# Patient Record
Sex: FEMALE | Race: WHITE
[De-identification: names, ages, dates, MRNs, and addresses within clinical notes are randomized per-mention and may not be internally consistent; named-entity substitution may affect disease eponyms.]

---
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
## 2021-01-21 ENCOUNTER — HOSPITAL ENCOUNTER (OUTPATIENT)
Dept: HOSPITAL 53 - M PLALAB | Age: 21
End: 2021-01-21
Attending: ADVANCED PRACTICE MIDWIFE
Payer: MEDICAID

## 2021-01-21 DIAGNOSIS — Z11.3: ICD-10-CM

## 2021-01-21 DIAGNOSIS — E28.2: Primary | ICD-10-CM

## 2021-01-21 LAB
CHLAMYDIA DNA AMPLIFICATION: NEGATIVE
EST. AVERAGE GLUCOSE BLD GHB EST-MCNC: 111 MG/DL (ref 60–110)
N GONORRHOEA RRNA SPEC QL NAA+PROBE: NEGATIVE

## 2021-01-23 ENCOUNTER — HOSPITAL ENCOUNTER (OUTPATIENT)
Dept: HOSPITAL 53 - M LABSMTC | Age: 21
End: 2021-01-23
Attending: PEDIATRICS
Payer: SELF-PAY

## 2021-01-23 DIAGNOSIS — Z20.822: Primary | ICD-10-CM

## 2021-04-30 ENCOUNTER — HOSPITAL ENCOUNTER (OUTPATIENT)
Dept: HOSPITAL 53 - M WHC | Age: 21
End: 2021-04-30
Attending: ADVANCED PRACTICE MIDWIFE
Payer: MEDICAID

## 2021-04-30 DIAGNOSIS — E28.2: Primary | ICD-10-CM

## 2021-04-30 DIAGNOSIS — Z30.431: ICD-10-CM

## 2021-04-30 NOTE — REP
INDICATION:

E28.2 PCOS,Z30.431 SURVEILLANCE OF IUD.



COMPARISON:

Comparison pelvic sonography 10 July 2020..



TECHNIQUE:

Transabdominal and transvaginal scanning were performed.



FINDINGS:

Uterine dimensions are normal at 8.0 x 3.0 x 4.1 cm.  Endometrial echo is 0.4 cm thick

and centrally placed.  No free fluid is seen in the cul-de-sac.  Visualized bladder

walls are smooth. An IUD is again noted in the uterine endometrium in good position.

Visualized bladder walls are smooth.



The right ovary has dimensions of 3.8 x 2.3 x 2.8 cm.  It's Doppler flow is normal

with a resistive index of 0.63.





The left ovary dimensions are normal as well at 4.0 x 2.1 x 3.1 cm.  It's Doppler flow

was normal with resistive index of 0.69.



IMPRESSION:

Normal pelvic sonography.  IUD remains in place, good position.





<Electronically signed by José Miguel Nunez > 04/30/21 2069

## 2021-06-03 ENCOUNTER — HOSPITAL ENCOUNTER (OUTPATIENT)
Dept: HOSPITAL 53 - M LAB REF | Age: 21
End: 2021-06-03
Attending: PHYSICIAN ASSISTANT
Payer: COMMERCIAL

## 2021-06-03 DIAGNOSIS — B34.9: Primary | ICD-10-CM

## 2021-08-19 ENCOUNTER — HOSPITAL ENCOUNTER (OUTPATIENT)
Dept: HOSPITAL 53 - M LABSMTC | Age: 21
End: 2021-08-19
Attending: PEDIATRICS

## 2021-08-19 DIAGNOSIS — Z20.822: Primary | ICD-10-CM

## 2022-01-12 ENCOUNTER — HOSPITAL ENCOUNTER (OUTPATIENT)
Dept: HOSPITAL 53 - M LABSMTC | Age: 22
End: 2022-01-12
Attending: PEDIATRICS

## 2022-01-12 DIAGNOSIS — Z20.822: Primary | ICD-10-CM

## 2022-04-25 ENCOUNTER — HOSPITAL ENCOUNTER (OUTPATIENT)
Dept: HOSPITAL 53 - M WUC | Age: 22
End: 2022-04-25
Attending: FAMILY MEDICINE
Payer: COMMERCIAL

## 2022-04-25 DIAGNOSIS — F41.9: Primary | ICD-10-CM

## 2022-04-25 LAB
25(OH)D3 SERPL-MCNC: 12.7 NG/ML (ref 30–100)
BASOPHILS # BLD AUTO: 0 10^3/UL (ref 0–0.2)
BASOPHILS NFR BLD AUTO: 0.2 % (ref 0–1)
BUN SERPL-MCNC: 14 MG/DL (ref 7–18)
CALCIUM SERPL-MCNC: 8.8 MG/DL (ref 8.5–10.1)
CHLORIDE SERPL-SCNC: 107 MEQ/L (ref 98–107)
CO2 SERPL-SCNC: 30 MEQ/L (ref 21–32)
CREAT SERPL-MCNC: 0.74 MG/DL (ref 0.55–1.3)
EOSINOPHIL # BLD AUTO: 0.1 10^3/UL (ref 0–0.5)
EOSINOPHIL NFR BLD AUTO: 0.9 % (ref 0–3)
GFR SERPL CREATININE-BSD FRML MDRD: > 60 ML/MIN/{1.73_M2} (ref 60–?)
GLUCOSE SERPL-MCNC: 91 MG/DL (ref 70–100)
HCT VFR BLD AUTO: 40.3 % (ref 36–47)
HGB BLD-MCNC: 13.3 G/DL (ref 12–15.5)
LYMPHOCYTES # BLD AUTO: 3.5 10^3/UL (ref 1.5–5)
LYMPHOCYTES NFR BLD AUTO: 39.3 % (ref 24–44)
MCH RBC QN AUTO: 30.7 PG (ref 27–33)
MCHC RBC AUTO-ENTMCNC: 33 G/DL (ref 32–36.5)
MCV RBC AUTO: 93.1 FL (ref 80–96)
MONOCYTES # BLD AUTO: 0.9 10^3/UL (ref 0–0.8)
MONOCYTES NFR BLD AUTO: 10.3 % (ref 2–8)
NEUTROPHILS # BLD AUTO: 4.3 10^3/UL (ref 1.5–8.5)
NEUTROPHILS NFR BLD AUTO: 48.8 % (ref 36–66)
PLATELET # BLD AUTO: 361 10^3/UL (ref 150–450)
POTASSIUM SERPL-SCNC: 4 MEQ/L (ref 3.5–5.1)
RBC # BLD AUTO: 4.33 10^6/UL (ref 4–5.4)
SODIUM SERPL-SCNC: 141 MEQ/L (ref 136–145)
TSH SERPL DL<=0.005 MIU/L-ACNC: 1.67 UIU/ML (ref 0.36–3.74)
WBC # BLD AUTO: 8.9 10^3/UL (ref 4–10)

## 2022-06-23 ENCOUNTER — HOSPITAL ENCOUNTER (OUTPATIENT)
Dept: HOSPITAL 53 - M PLALAB | Age: 22
End: 2022-06-23
Attending: ADVANCED PRACTICE MIDWIFE
Payer: COMMERCIAL

## 2022-06-23 DIAGNOSIS — Z12.4: Primary | ICD-10-CM

## 2022-06-23 LAB
EST. AVERAGE GLUCOSE BLD GHB EST-MCNC: 105 MG/DL (ref 60–110)
N GONORRHOEA RRNA SPEC QL NAA+PROBE: NEGATIVE

## 2022-06-23 PROCEDURE — 36415 COLL VENOUS BLD VENIPUNCTURE: CPT

## 2022-06-23 PROCEDURE — 87850 N. GONORRHOEAE ASSAY W/OPTIC: CPT

## 2022-06-23 PROCEDURE — 87810 CHLMYD TRACH ASSAY W/OPTIC: CPT

## 2022-06-23 PROCEDURE — 87661 TRICHOMONAS VAGINALIS AMPLIF: CPT

## 2022-06-23 PROCEDURE — 83036 HEMOGLOBIN GLYCOSYLATED A1C: CPT

## 2022-09-13 ENCOUNTER — HOSPITAL ENCOUNTER (OUTPATIENT)
Dept: HOSPITAL 53 - M EMP | Age: 22
End: 2022-09-13
Attending: FAMILY MEDICINE

## 2022-09-13 DIAGNOSIS — Z20.822: Primary | ICD-10-CM

## 2022-09-28 ENCOUNTER — HOSPITAL ENCOUNTER (OUTPATIENT)
Dept: HOSPITAL 53 - M LABSMTC | Age: 22
End: 2022-09-28
Attending: FAMILY MEDICINE

## 2022-09-28 DIAGNOSIS — Z20.822: Primary | ICD-10-CM

## 2022-09-28 DIAGNOSIS — Z11.52: ICD-10-CM

## 2023-01-31 ENCOUNTER — HOSPITAL ENCOUNTER (OUTPATIENT)
Dept: HOSPITAL 53 - M LABSMTC | Age: 23
End: 2023-01-31
Attending: FAMILY MEDICINE

## 2023-01-31 DIAGNOSIS — Z20.828: Primary | ICD-10-CM

## 2023-01-31 DIAGNOSIS — Z11.52: ICD-10-CM

## 2023-01-31 LAB — RSV RNA NPH QL NAA+PROBE: NEGATIVE

## 2024-01-26 ENCOUNTER — HOSPITAL ENCOUNTER (OUTPATIENT)
Dept: HOSPITAL 53 - M PLALAB | Age: 24
End: 2024-01-26
Attending: ADVANCED PRACTICE MIDWIFE
Payer: COMMERCIAL

## 2024-01-26 DIAGNOSIS — E28.2: Primary | ICD-10-CM

## 2024-01-26 LAB
ALBUMIN SERPL BCG-MCNC: 3.5 G/DL (ref 3.2–5.2)
ALP SERPL-CCNC: 89 U/L (ref 46–116)
ALT SERPL W P-5'-P-CCNC: 75 U/L (ref 7–40)
AST SERPL-CCNC: 42 U/L (ref ?–34)
BILIRUB SERPL-MCNC: 1.3 MG/DL (ref 0.3–1.2)
BUN SERPL-MCNC: 9 MG/DL (ref 9–23)
CALCIUM SERPL-MCNC: 9 MG/DL (ref 8.5–10.1)
CHLORIDE SERPL-SCNC: 104 MMOL/L (ref 98–107)
CHOLEST SERPL-MCNC: 214 MG/DL (ref ?–200)
CHOLEST/HDLC SERPL: 4.41 {RATIO} (ref ?–5)
CO2 SERPL-SCNC: 29 MMOL/L (ref 20–31)
CREAT SERPL-MCNC: 0.69 MG/DL (ref 0.55–1.3)
EST. AVERAGE GLUCOSE BLD GHB EST-MCNC: 108 MG/DL (ref 60–110)
GFR SERPL CREATININE-BSD FRML MDRD: > 60 ML/MIN/{1.73_M2} (ref 60–?)
GLUCOSE SERPL-MCNC: 89 MG/DL (ref 60–100)
HDLC SERPL-MCNC: 48.5 MG/DL (ref 40–?)
LDLC SERPL CALC-MCNC: 134.9 MG/DL (ref ?–100)
NONHDLC SERPL-MCNC: 165.5 MG/DL
POTASSIUM SERPL-SCNC: 4.1 MMOL/L (ref 3.5–5.1)
PROT SERPL-MCNC: 6.7 G/DL (ref 5.7–8.2)
SODIUM SERPL-SCNC: 140 MMOL/L (ref 136–145)
T4 FREE SERPL-MCNC: 1.39 NG/DL (ref 0.89–1.76)
TRIGL SERPL-MCNC: 153 MG/DL (ref ?–150)
TSH SERPL DL<=0.005 MIU/L-ACNC: 6.86 UIU/ML (ref 0.55–4.78)

## 2024-07-23 ENCOUNTER — HOSPITAL ENCOUNTER (OUTPATIENT)
Dept: HOSPITAL 53 - M LAB REF | Age: 24
End: 2024-07-23
Attending: PHYSICIAN ASSISTANT
Payer: COMMERCIAL

## 2024-07-23 DIAGNOSIS — N39.0: Primary | ICD-10-CM

## 2024-07-23 LAB
APPEARANCE UR: (no result)
BACTERIA UR QL AUTO: (no result)
BILIRUB UR QL STRIP.AUTO: NEGATIVE
GLUCOSE UR QL STRIP.AUTO: NEGATIVE MG/DL
HGB UR QL STRIP.AUTO: (no result)
KETONES UR QL STRIP.AUTO: NEGATIVE MG/DL
LEUKOCYTE ESTERASE UR QL STRIP.AUTO: (no result)
MUCOUS THREADS URNS QL MICRO: (no result)
NITRITE UR QL STRIP.AUTO: POSITIVE
PH UR STRIP.AUTO: 6 UNITS (ref 5–9)
PROT UR QL STRIP.AUTO: (no result) MG/DL
RBC # UR AUTO: (no result) /HPF (ref 0–3)
SP GR UR STRIP.AUTO: 1.02 (ref 1–1.03)
SQUAMOUS #/AREA URNS AUTO: 0 /HPF (ref 0–6)
UROBILINOGEN UR QL STRIP.AUTO: 0.2 MG/DL (ref 0–2)
WBC #/AREA URNS AUTO: (no result) /HPF (ref 0–3)

## 2024-11-04 ENCOUNTER — HOSPITAL ENCOUNTER (OUTPATIENT)
Dept: HOSPITAL 53 - M SFHCLERA | Age: 24
End: 2024-11-04
Attending: FAMILY MEDICINE
Payer: COMMERCIAL

## 2024-11-04 DIAGNOSIS — R79.89: ICD-10-CM

## 2024-11-04 DIAGNOSIS — Z00.00: Primary | ICD-10-CM

## 2024-11-04 LAB
T4 FREE SERPL-MCNC: 1.22 NG/DL (ref 0.89–1.76)
THYROPEROXIDASE AB SERPL IA-ACNC: 42 U/ML (ref ?–60)
TSH SERPL DL<=0.005 MIU/L-ACNC: 3.84 UIU/ML (ref 0.55–4.78)

## 2025-03-03 ENCOUNTER — HOSPITAL ENCOUNTER (OUTPATIENT)
Dept: HOSPITAL 53 - M EMP | Age: 25
End: 2025-03-03
Attending: FAMILY MEDICINE

## 2025-03-03 DIAGNOSIS — Z11.52: Primary | ICD-10-CM

## 2025-04-03 ENCOUNTER — HOSPITAL ENCOUNTER (OUTPATIENT)
Dept: HOSPITAL 53 - M SDC | Age: 25
Discharge: HOME | End: 2025-04-03
Attending: OTOLARYNGOLOGY
Payer: COMMERCIAL

## 2025-04-03 VITALS — TEMPERATURE: 97.1 F | SYSTOLIC BLOOD PRESSURE: 127 MMHG | DIASTOLIC BLOOD PRESSURE: 76 MMHG | OXYGEN SATURATION: 96 %

## 2025-04-03 VITALS — BODY MASS INDEX: 42.7 KG/M2 | WEIGHT: 241 LBS | HEIGHT: 63 IN

## 2025-04-03 DIAGNOSIS — F41.9: ICD-10-CM

## 2025-04-03 DIAGNOSIS — G47.00: ICD-10-CM

## 2025-04-03 DIAGNOSIS — E28.2: ICD-10-CM

## 2025-04-03 DIAGNOSIS — J35.01: Primary | ICD-10-CM

## 2025-04-03 DIAGNOSIS — Z79.899: ICD-10-CM

## 2025-04-03 DIAGNOSIS — Z88.2: ICD-10-CM

## 2025-04-03 PROCEDURE — 88302 TISSUE EXAM BY PATHOLOGIST: CPT

## 2025-04-03 PROCEDURE — 42826 REMOVAL OF TONSILS: CPT

## 2025-05-14 ENCOUNTER — HOSPITAL ENCOUNTER (OUTPATIENT)
Dept: HOSPITAL 53 - M PLAIMG | Age: 25
End: 2025-05-14
Attending: PHYSICIAN ASSISTANT
Payer: COMMERCIAL

## 2025-05-14 DIAGNOSIS — N20.1: Primary | ICD-10-CM
